# Patient Record
Sex: FEMALE | Race: WHITE | NOT HISPANIC OR LATINO | URBAN - METROPOLITAN AREA
[De-identification: names, ages, dates, MRNs, and addresses within clinical notes are randomized per-mention and may not be internally consistent; named-entity substitution may affect disease eponyms.]

---

## 2023-07-13 ENCOUNTER — TELEPHONE (OUTPATIENT)
Dept: PRIMARY CARE | Facility: CLINIC | Age: 18
End: 2023-07-13

## 2023-07-13 DIAGNOSIS — N94.6 DYSMENORRHEA: Primary | ICD-10-CM

## 2023-07-13 PROBLEM — H61.22 HEARING LOSS DUE TO CERUMEN IMPACTION, LEFT: Status: ACTIVE | Noted: 2023-07-13

## 2023-07-13 PROBLEM — N20.9 UROLITHIASIS: Status: ACTIVE | Noted: 2023-07-13

## 2023-07-13 PROBLEM — R52 PAIN: Status: ACTIVE | Noted: 2023-07-13

## 2023-07-13 PROBLEM — J45.30 MILD PERSISTENT ASTHMA WITHOUT COMPLICATION (HHS-HCC): Status: ACTIVE | Noted: 2023-07-13

## 2023-07-13 RX ORDER — ALBUTEROL SULFATE 90 UG/1
AEROSOL, METERED RESPIRATORY (INHALATION)
COMMUNITY
Start: 2020-04-24

## 2023-07-13 RX ORDER — TRIPROLIDINE/PSEUDOEPHEDRINE 2.5MG-60MG
TABLET ORAL
COMMUNITY
Start: 2017-10-16 | End: 2023-07-13 | Stop reason: SDUPTHER

## 2023-07-13 RX ORDER — CETIRIZINE HYDROCHLORIDE 10 MG/1
1 TABLET, CHEWABLE ORAL DAILY
COMMUNITY
Start: 2016-10-28

## 2023-07-13 RX ORDER — NAPROXEN 25 MG/ML
SUSPENSION ORAL EVERY 8 HOURS
COMMUNITY
Start: 2022-08-12

## 2023-07-13 RX ORDER — MONTELUKAST SODIUM 5 MG/1
TABLET, CHEWABLE ORAL
COMMUNITY
Start: 2020-05-14

## 2023-07-13 RX ORDER — TRIPROLIDINE/PSEUDOEPHEDRINE 2.5MG-60MG
10 TABLET ORAL EVERY 6 HOURS PRN
Qty: 30 ML | Refills: 0 | Status: SHIPPED | OUTPATIENT
Start: 2023-07-13 | End: 2024-02-01 | Stop reason: SDUPTHER

## 2024-02-01 DIAGNOSIS — N94.6 DYSMENORRHEA: ICD-10-CM

## 2024-02-01 RX ORDER — TRIPROLIDINE/PSEUDOEPHEDRINE 2.5MG-60MG
10 TABLET ORAL EVERY 6 HOURS PRN
Qty: 30 ML | Refills: 0 | Status: SHIPPED | OUTPATIENT
Start: 2024-02-01 | End: 2024-05-01 | Stop reason: SDUPTHER

## 2024-02-01 NOTE — TELEPHONE ENCOUNTER
Pt mother called requesting refill for ibuprofen 100 mg/5 mL suspension to be sent to pharmacy. Pt is eduar able to swallow pills and is having really bad menstrual cramps.

## 2024-05-01 DIAGNOSIS — N94.6 DYSMENORRHEA: ICD-10-CM

## 2024-05-01 RX ORDER — TRIPROLIDINE/PSEUDOEPHEDRINE 2.5MG-60MG
200 TABLET ORAL EVERY 6 HOURS PRN
Qty: 200 ML | Refills: 0 | Status: SHIPPED | OUTPATIENT
Start: 2024-05-01

## 2024-09-09 ENCOUNTER — APPOINTMENT (OUTPATIENT)
Dept: PRIMARY CARE | Facility: CLINIC | Age: 19
End: 2024-09-09

## 2024-09-09 VITALS
DIASTOLIC BLOOD PRESSURE: 64 MMHG | SYSTOLIC BLOOD PRESSURE: 110 MMHG | HEART RATE: 103 BPM | OXYGEN SATURATION: 97 % | HEIGHT: 65 IN | WEIGHT: 117.4 LBS | TEMPERATURE: 97.5 F | BODY MASS INDEX: 19.56 KG/M2

## 2024-09-09 DIAGNOSIS — J45.30 MILD PERSISTENT ASTHMA WITHOUT COMPLICATION (HHS-HCC): Primary | ICD-10-CM

## 2024-09-09 DIAGNOSIS — F41.8 SITUATIONAL ANXIETY: ICD-10-CM

## 2024-09-09 DIAGNOSIS — N94.6 DYSMENORRHEA: ICD-10-CM

## 2024-09-09 DIAGNOSIS — Z00.00 ROUTINE GENERAL MEDICAL EXAMINATION AT A HEALTH CARE FACILITY: ICD-10-CM

## 2024-09-09 PROCEDURE — 99395 PREV VISIT EST AGE 18-39: CPT | Performed by: FAMILY MEDICINE

## 2024-09-09 PROCEDURE — 3008F BODY MASS INDEX DOCD: CPT | Performed by: FAMILY MEDICINE

## 2024-09-09 PROCEDURE — 1036F TOBACCO NON-USER: CPT | Performed by: FAMILY MEDICINE

## 2024-09-09 RX ORDER — TRIPROLIDINE/PSEUDOEPHEDRINE 2.5MG-60MG
200 TABLET ORAL EVERY 6 HOURS PRN
Qty: 200 ML | Refills: 3 | Status: SHIPPED | OUTPATIENT
Start: 2024-09-09

## 2024-09-09 RX ORDER — PROPRANOLOL HYDROCHLORIDE 10 MG/1
10 TABLET ORAL 3 TIMES DAILY
Qty: 90 TABLET | Refills: 11 | Status: SHIPPED | OUTPATIENT
Start: 2024-09-09 | End: 2025-09-09

## 2024-09-09 RX ORDER — MONTELUKAST SODIUM 5 MG/1
10 TABLET, CHEWABLE ORAL NIGHTLY
Qty: 180 TABLET | Refills: 3 | Status: SHIPPED | OUTPATIENT
Start: 2024-09-09 | End: 2025-09-09

## 2024-09-09 RX ORDER — ALBUTEROL SULFATE 90 UG/1
2 INHALANT RESPIRATORY (INHALATION) EVERY 4 HOURS PRN
Qty: 18 G | Refills: 3 | Status: SHIPPED | OUTPATIENT
Start: 2024-09-09

## 2024-09-09 ASSESSMENT — PAIN SCALES - GENERAL: PAINLEVEL: 0-NO PAIN

## 2024-09-09 NOTE — PROGRESS NOTES
Subjective   Patient ID: Mercy Riddle is a 18 y.o. female who presents for Med Refill (Patient is present today for a medication refill).  HPI  18-year-old female for wellness visit.  Refill on medication for anxiety, asthma, dysmenorrhea.  Review of Systems  Constitutional: no chills, no fever and no night sweats.   Eyes: no blurred vision and no eyesight problems.   ENT: no hearing loss, no nasal congestion, no nasal discharge, no hoarseness and no sore throat.   Cardiovascular: no chest pain, no intermittent leg claudication, no lower extremity edema, no palpitations and no syncope.   Respiratory: no cough, no shortness of breath during exertion, no shortness of breath at rest and no wheezing.   Gastrointestinal: no abdominal pain, no blood in stools, no constipation, no diarrhea, no melena, no nausea, no rectal pain and no vomiting.   Genitourinary: no dysuria, no change in urinary frequency, no urinary hesitancy and no feelings of urinary urgency.   Neurological: no difficulty walking, no headache, no limb weakness, no numbness and no tingling.   Psychiatric: Situational anxiety, no depression, no anhedonia and no substance use disorders.   Endocrine: no recent weight gain and no recent weight loss.   Hematologic/Lymphatic: no tendency for easy bruising and no swollen glands.  Visit Vitals  /64 (BP Location: Left arm, Patient Position: Sitting, BP Cuff Size: Adult)   Pulse 103   Temp 36.4 °C (97.5 °F) (Temporal)        Objective   Physical Exam  Constitutional: Alert and in no acute distress. Well developed, well nourished.   Eyes: Pupils were equal in size, round, reactive to light (PERRL) with normal accommodation and extraocular movements intact (EOMI). Ophthalmoscopic examination: Normal.   Ears, Nose, Mouth, and Throat: External inspection of ears and nose: Normal. Otoscopic examination: Normal. Lips, teeth, and gums: Normal. Oropharynx: Normal.   Neck: No neck mass was observed. Supple.  Thyroid not enlarged and there were no palpable thyroid nodules.   Cardiovascular: Heart rate and rhythm were normal, normal S1 and S2, no gallops, no murmurs and no pericardial rub. Carotid pulses: Normal with no bruits. Abdominal aorta: Normal. Pedal pulses: Normal. No peripheral edema.   Pulmonary: No respiratory distress. Clear bilateral breath sounds.   Abdomen: Soft nontender; no abdominal mass palpated. Normal bowel sounds. No organomegaly.   Skin: Normal skin color and pigmentation, normal skin turgor, and no rash.   Psychiatric: Judgment and insight: Intact. Mood and affect: Normal.  Assessment/Plan   Problem List Items Addressed This Visit             ICD-10-CM    Dysmenorrhea N94.6    Relevant Medications    ibuprofen 100 mg/5 mL suspension    Mild persistent asthma without complication ([facility]) - Primary J45.30    Relevant Medications    albuterol 90 mcg/actuation inhaler    montelukast (Singulair) 5 mg chewable tablet    Situational anxiety F41.8    Relevant Medications    propranolol (Inderal) 10 mg tablet    Routine general medical examination at a health care facility Z00.00

## 2025-02-03 DIAGNOSIS — N94.6 DYSMENORRHEA: ICD-10-CM

## 2025-02-03 RX ORDER — TRIPROLIDINE/PSEUDOEPHEDRINE 2.5MG-60MG
200 TABLET ORAL EVERY 6 HOURS PRN
Qty: 200 ML | Refills: 3 | Status: SHIPPED | OUTPATIENT
Start: 2025-02-03